# Patient Record
Sex: FEMALE | Race: WHITE | NOT HISPANIC OR LATINO | URBAN - METROPOLITAN AREA
[De-identification: names, ages, dates, MRNs, and addresses within clinical notes are randomized per-mention and may not be internally consistent; named-entity substitution may affect disease eponyms.]

---

## 2019-05-07 PROBLEM — Z00.00 ENCOUNTER FOR PREVENTIVE HEALTH EXAMINATION: Status: ACTIVE | Noted: 2019-05-07

## 2019-05-13 ENCOUNTER — OUTPATIENT (OUTPATIENT)
Dept: OUTPATIENT SERVICES | Facility: HOSPITAL | Age: 59
LOS: 1 days | End: 2019-05-13
Payer: COMMERCIAL

## 2019-05-13 ENCOUNTER — APPOINTMENT (OUTPATIENT)
Dept: MAMMOGRAPHY | Facility: CLINIC | Age: 59
End: 2019-05-13
Payer: COMMERCIAL

## 2019-05-13 DIAGNOSIS — Z12.39 ENCOUNTER FOR OTHER SCREENING FOR MALIGNANT NEOPLASM OF BREAST: ICD-10-CM

## 2019-05-13 PROCEDURE — 77063 BREAST TOMOSYNTHESIS BI: CPT | Mod: 26

## 2019-05-13 PROCEDURE — 77067 SCR MAMMO BI INCL CAD: CPT

## 2019-05-13 PROCEDURE — 77067 SCR MAMMO BI INCL CAD: CPT | Mod: 26

## 2019-05-13 PROCEDURE — 77063 BREAST TOMOSYNTHESIS BI: CPT

## 2020-10-19 ENCOUNTER — APPOINTMENT (OUTPATIENT)
Dept: RHEUMATOLOGY | Facility: CLINIC | Age: 60
End: 2020-10-19
Payer: COMMERCIAL

## 2020-10-19 VITALS
BODY MASS INDEX: 29.03 KG/M2 | TEMPERATURE: 99.8 F | SYSTOLIC BLOOD PRESSURE: 149 MMHG | WEIGHT: 185 LBS | HEART RATE: 102 BPM | OXYGEN SATURATION: 97 % | DIASTOLIC BLOOD PRESSURE: 90 MMHG | HEIGHT: 67 IN

## 2020-10-19 DIAGNOSIS — Z78.9 OTHER SPECIFIED HEALTH STATUS: ICD-10-CM

## 2020-10-19 DIAGNOSIS — Z72.0 TOBACCO USE: ICD-10-CM

## 2020-10-19 PROCEDURE — 99072 ADDL SUPL MATRL&STAF TM PHE: CPT

## 2020-10-19 PROCEDURE — 99205 OFFICE O/P NEW HI 60 MIN: CPT | Mod: 25

## 2020-10-19 PROCEDURE — 36415 COLL VENOUS BLD VENIPUNCTURE: CPT

## 2020-10-30 LAB — ERYTHROCYTE [SEDIMENTATION RATE] IN BLOOD BY WESTERGREN METHOD: 22 MM/HR

## 2020-11-16 PROBLEM — Z72.0 OCCASIONAL CIGARETTE SMOKER: Status: ACTIVE | Noted: 2020-11-16

## 2020-11-16 PROBLEM — Z78.9 SOCIAL ALCOHOL USE: Status: ACTIVE | Noted: 2020-11-16

## 2020-11-16 RX ORDER — VITAMIN K2 90 MCG
125 MCG CAPSULE ORAL
Qty: 30 | Refills: 11 | Status: ACTIVE | COMMUNITY
Start: 2020-11-16

## 2020-11-16 RX ORDER — IBUPROFEN 200 MG
600 CAPSULE ORAL
Qty: 60 | Refills: 11 | Status: ACTIVE | COMMUNITY
Start: 2020-11-16

## 2020-11-16 NOTE — CONSULT LETTER
[Dear  ___] : Dear  [unfilled], [Consult Letter:] : I had the pleasure of evaluating your patient, [unfilled]. [Please see my note below.] : Please see my note below. [Consult Closing:] : Thank you very much for allowing me to participate in the care of this patient.  If you have any questions, please do not hesitate to contact me. [Sincerely,] : Sincerely, [DrUday  ___] : Dr. TRUJILLO

## 2020-11-16 NOTE — HISTORY OF PRESENT ILLNESS
[FreeTextEntry1] : 60y F with MS with + Sjögren's abs\par \par - history of MS- not on treatments. Taking vitamin and supplements.\par - found to have +SSA because of LLE pain/stiffness swelling in L knee, L ankle swelling. Had MRI w/ no changes in spine/brain from previous 5 years ago.  Labs done. Stiffness in LLE.  Recent weight gain during quarantine- no weight loss. Stiffness x 5 min max usually, happens after prolonged sitting. No report of inflammatory arthritis\par - +Degenerative joint changes\par - denies major sicca symptoms, minimal. Denies pain. Denies any other systemic symptoms\par - ROS: denies constitutional sxs of fever/chills, weight loss, alopecia, oral/nasal ulcers, CP/SOB, hematuria/frothy urine, myalgias, arthralgias/arthritis, Raynaud's, rash, nodules, or photosensitivity.  No history of miscarriages or thromboses (DVT, PE, CVA, MI, etc).\par \par \par BMD\par - DEXA recently w/ osteoporosis. \par Previous treatments:  X\par Prior fractures: N\par Parental hip fractures: N\par Smoking history: Previous social\par Alcohol use:N\par Inflammatory arthritis/RA history: N\par Supplement use: calcium/Mg/Zn 500mg, D3 5000 IU daily, Zn, turmeric, vitamin C\par Hormone use: None. Normal age of menarche/menopause.\par  \par \par IImaging reviewed:\par MRI: degenerative disc disease lumbar spine [de-identified] : \par \par All other ROS negative except as mentioned in HPI.

## 2020-11-16 NOTE — ASSESSMENT
[FreeTextEntry1] : 60y F with history of MS w/ minimal to no sicca symptoms and SSA+, likely compatible with mild Sjögren's syndrome (SSA+, mild sicca w/o other organ system or immunologic/hematologic involvements). Notably, she has no other hematologic, immunologic, cardiopulmonary, neurologic, etc involvements.\par She has a hx of post-menopausal osteoporosis w/ last DEXA w/ dx of OP. She is currently on appropriate supplements and has no other significant FRAX criteria for increased fx risk.\par She has degenerative osteoarthritis and chronic LBP from spondylosis and DDD in the lumbar spine.\par \par Review of labs- normal early Sjögren's panel, normal inflammatory markers. AVISE panel negative w/ +JON strong positive and negative HEp-2 stain/pattern. She has a negative RF which is indicates usually a better prognosis for those w/ Sjögren's and reduced risk for extraglandular involvement or lymphoma risk.  She has +SSA (Ro) 52 and 60 kD IgG seen in Sjögren's and an equivocal RNA rush III likely not of any clinical significant, as she has no symptoms of systemic sclerosis and stable BP.\par She also has elevated ACL IgG of a significant titer and a low B2GP12 IgG. I do not recommend anticoagulation. I would recommend ASA as per cardiac risk.  Complements normal other than a slight anti-C1q IgG noted, which is common in Sjögren's syndrome and SLE. She has no history of thromboses or miscarriages and does not fulfill [place] criteria for APS. She had a normal RENAN/IPEP.\par \par Her metabolic bone labs are normal with good 25-OH D3, normal PTH, and normal bone ALP levels.\par \par - start alendronate 70mg/wk. R/B/A reviewed including ONJ - pt to call if any jaw surgery, implant, bone grafts, etc. Pt will not begin this treatment if any planned procedures and understands the risks. She understands the risks of esophagitis and will take w a full glass of water and remain upright for 30-40 minutes. If she experiences GI intolerance we can try Reclast IV or Prolia SQ as alternative treatment regimens, both of which, are fully acceptable.\par - c/w calcium 600mg BID w/ meals and D3 supplementation\par - c/w exercise, stretching, and joint supplements as needed\par \par RTO 6 months.

## 2020-11-20 ENCOUNTER — APPOINTMENT (OUTPATIENT)
Dept: UROGYNECOLOGY | Facility: CLINIC | Age: 60
End: 2020-11-20
Payer: COMMERCIAL

## 2020-11-20 VITALS
HEIGHT: 67 IN | SYSTOLIC BLOOD PRESSURE: 106 MMHG | BODY MASS INDEX: 28.41 KG/M2 | WEIGHT: 181 LBS | DIASTOLIC BLOOD PRESSURE: 70 MMHG

## 2020-11-20 DIAGNOSIS — K59.00 CONSTIPATION, UNSPECIFIED: ICD-10-CM

## 2020-11-20 DIAGNOSIS — G35 MULTIPLE SCLEROSIS: ICD-10-CM

## 2020-11-20 DIAGNOSIS — M35.00 SICCA SYNDROME, UNSPECIFIED: ICD-10-CM

## 2020-11-20 DIAGNOSIS — Z87.39 PERSONAL HISTORY OF OTHER DISEASES OF THE MUSCULOSKELETAL SYSTEM AND CONNECTIVE TISSUE: ICD-10-CM

## 2020-11-20 DIAGNOSIS — Z86.39 PERSONAL HISTORY OF OTHER ENDOCRINE, NUTRITIONAL AND METABOLIC DISEASE: ICD-10-CM

## 2020-11-20 LAB
BILIRUB UR QL STRIP: NORMAL
CLARITY UR: CLEAR
COLLECTION METHOD: NORMAL
GLUCOSE UR-MCNC: NORMAL
HCG UR QL: 0.2 EU/DL
HGB UR QL STRIP.AUTO: NORMAL
KETONES UR-MCNC: ABNORMAL
LEUKOCYTE ESTERASE UR QL STRIP: NORMAL
NITRITE UR QL STRIP: NORMAL
PH UR STRIP: 6.5
PROT UR STRIP-MCNC: NORMAL
SP GR UR STRIP: 1.02

## 2020-11-20 PROCEDURE — 99205 OFFICE O/P NEW HI 60 MIN: CPT

## 2020-11-20 RX ORDER — MAGNESIUM OXIDE/MAG AA CHELATE 300 MG
CAPSULE ORAL
Refills: 0 | Status: ACTIVE | COMMUNITY

## 2020-11-20 RX ORDER — ASCORBIC ACID 125 MG
TABLET,CHEWABLE ORAL
Refills: 0 | Status: ACTIVE | COMMUNITY

## 2020-11-20 RX ORDER — COLD-HOT PACK
EACH MISCELLANEOUS
Refills: 0 | Status: ACTIVE | COMMUNITY

## 2020-11-20 RX ORDER — MULTIVIT-MIN/IRON/FOLIC ACID/K 18-600-40
CAPSULE ORAL
Refills: 0 | Status: ACTIVE | COMMUNITY

## 2020-11-20 NOTE — DISCUSSION/SUMMARY
[FreeTextEntry1] : \par Tanisha presents with leakage of urine during orgasm, she does not have penetrative intercourse for the past many years, she reports she orgasms from masturbation. She otherwise does not  have incontinence or frequency. She does have a history of MS. On exam normal PVR, no prolapse. DIscussed OAB vs JAMILAH and unclear what type of incontinence this is. Discussed UDS given MS and to evaluate incontinence. We did review treatment options for both OAB and JAMILAH and will further address after UDS. All questions were answered. \par \par []  UDS

## 2020-11-20 NOTE — LETTER BODY
as tolerated [Dear  ___] : Dear  [unfilled], [Attached please find my note.] : Attached please find my note. [Thank you very much for allowing me to participate in the care of this patient. If you have any questions, please do not hesitate to contact me] : Thank you very much for allowing me to participate in the care of this patient. If you have any questions, please do not hesitate to contact me.

## 2020-11-20 NOTE — PROCEDURE
[FreeTextEntry1] : sterile straight catheterization performed to assess post void residual due to leakage of urine

## 2020-11-20 NOTE — REASON FOR VISIT
[Initial Visit ___] : an initial visit for [unfilled] [Questionnaire Received] : Patient questionnaire received [Intake Form Reviewed] : Patient intake form with past medical history, surgical history, family history and social history reviewed today [Urinary Incontinence] : urinary incontinence [Pelvic Strengthening] : pelvic strengthening

## 2020-11-20 NOTE — HISTORY OF PRESENT ILLNESS
[Cystocele (Obstetric)] : no [Vaginal Wall Prolapse] : no [Rectal Prolapse] : no [Stress Incontinence] : no [Urge Incontinence Of Urine] : no [Unable To Restrain Bowel Movement] : no [Urinary Frequency More Than Twice At Night (Nocturia)] : no nocturia [Urinary Stream Starts And Stops] : no [Urinary Frequency] : no [Urinary Tract Infection] : no [Hematuria] : no [Constipation Obstructed Defecation] : mild [Incomplete Emptying Of Stool] : no [Pelvic Pain] : no [Vaginal Pain] : no [Vulvar Pain] : no [] : no [FreeTextEntry1] : \par Buster presents with leakage with orgasm, she reports that this has been happening for 6-8 months with masturbation, denies leakage with laughing/coughing/sneezing, she reports sometimes she has urgency, but she has no experience urgency incontinence, reports during the day she does not have frequency., nocturia only if she drinks at night, no hematuria, denies incomplete emptying, denies recurrent UTI, denies intermittent stream, denies bulge, denies vaginal bleeding,  reports constipation, she has not tried anything for this besides PFE has been doing for 4 months without a difference \par \par she has a history of MS and diagnosed with spinal tap due to foot symptoms, then followed by MRI, diagnosed 13 years ago, does not take any medication, lately had some issues

## 2020-11-20 NOTE — PHYSICAL EXAM
[Chaperone Present] : A chaperone was present in the examining room during all aspects of the physical examination [No Acute Distress] : in no acute distress [Well developed] : well developed [Well Nourished] : ~L well nourished [Oriented x3] : oriented to person, place, and time [Normal Memory] : ~T memory was ~L unimpaired [Normal Mood/Affect] : mood and affect are normal [Cough] : no cough [No Edema] : ~T edema was not present [Tenderness] : ~T no ~M abdominal tenderness observed [Distended] : not distended [Inguinal LAD] : no adenopathy was noted in the inguinal lymph nodes [Warm and Dry] : was warm and dry to touch [Vulvar Atrophy] : vulvar atrophy [Labia Majora] : were normal [Labia Minora] : were normal [Normal Appearance] : general appearance was normal [No Bleeding] : there was no active vaginal bleeding [3] : 3 [Normal] : no abnormalities [Post Void Residual ____ml] : post void residual was [unfilled] ml [FreeTextEntry3] : neg CST [de-identified] : no prolapse  [de-identified] : pt reports she did not completely empty prior to void

## 2020-11-30 ENCOUNTER — NON-APPOINTMENT (OUTPATIENT)
Age: 60
End: 2020-11-30

## 2020-12-15 ENCOUNTER — APPOINTMENT (OUTPATIENT)
Dept: RHEUMATOLOGY | Facility: CLINIC | Age: 60
End: 2020-12-15
Payer: COMMERCIAL

## 2020-12-15 VITALS
BODY MASS INDEX: 27.47 KG/M2 | TEMPERATURE: 99.6 F | HEART RATE: 110 BPM | HEIGHT: 67 IN | DIASTOLIC BLOOD PRESSURE: 79 MMHG | SYSTOLIC BLOOD PRESSURE: 128 MMHG | WEIGHT: 175 LBS | OXYGEN SATURATION: 96 %

## 2020-12-15 DIAGNOSIS — M25.572 PAIN IN RIGHT ANKLE AND JOINTS OF RIGHT FOOT: ICD-10-CM

## 2020-12-15 DIAGNOSIS — G89.29 PAIN IN RIGHT ANKLE AND JOINTS OF RIGHT FOOT: ICD-10-CM

## 2020-12-15 DIAGNOSIS — M25.571 PAIN IN RIGHT ANKLE AND JOINTS OF RIGHT FOOT: ICD-10-CM

## 2020-12-15 DIAGNOSIS — M35.00 SICCA SYNDROME, UNSPECIFIED: ICD-10-CM

## 2020-12-15 DIAGNOSIS — Z91.89 OTHER SPECIFIED PERSONAL RISK FACTORS, NOT ELSEWHERE CLASSIFIED: ICD-10-CM

## 2020-12-15 PROCEDURE — 99072 ADDL SUPL MATRL&STAF TM PHE: CPT

## 2020-12-15 PROCEDURE — 99215 OFFICE O/P EST HI 40 MIN: CPT | Mod: 25

## 2020-12-15 RX ORDER — ALENDRONATE SODIUM 70 MG/1
70 TABLET ORAL
Qty: 1 | Refills: 0 | Status: DISCONTINUED | COMMUNITY
Start: 2020-10-19 | End: 2020-12-15

## 2020-12-16 ENCOUNTER — TRANSCRIPTION ENCOUNTER (OUTPATIENT)
Age: 60
End: 2020-12-16

## 2020-12-21 ENCOUNTER — APPOINTMENT (OUTPATIENT)
Dept: UROGYNECOLOGY | Facility: CLINIC | Age: 60
End: 2020-12-21

## 2021-01-12 ENCOUNTER — NON-APPOINTMENT (OUTPATIENT)
Age: 61
End: 2021-01-12

## 2021-01-26 PROBLEM — M35.00 SJOGREN'S SYNDROME WITHOUT EXTRAGLANDULAR INVOLVEMENT: Status: ACTIVE | Noted: 2020-11-16

## 2021-01-26 PROBLEM — Z91.89 AT HIGH RISK FOR FRACTURE: Status: ACTIVE | Noted: 2020-11-16

## 2021-05-18 ENCOUNTER — NON-APPOINTMENT (OUTPATIENT)
Age: 61
End: 2021-05-18

## 2021-06-11 ENCOUNTER — APPOINTMENT (OUTPATIENT)
Dept: NEPHROLOGY | Facility: CLINIC | Age: 61
End: 2021-06-11

## 2021-09-03 ENCOUNTER — APPOINTMENT (OUTPATIENT)
Age: 61
End: 2021-09-03
Payer: COMMERCIAL

## 2021-09-03 DIAGNOSIS — N39.41 URGE INCONTINENCE: ICD-10-CM

## 2021-09-03 DIAGNOSIS — N39.491 COITAL INCONTINENCE: ICD-10-CM

## 2021-09-03 PROCEDURE — 52000 CYSTOURETHROSCOPY: CPT

## 2021-09-03 RX ORDER — CLOBETASOL PROPIONATE 0.5 MG/G
0.05 OINTMENT TOPICAL DAILY
Qty: 1 | Refills: 0 | Status: ACTIVE | COMMUNITY
Start: 2021-09-03 | End: 1900-01-01

## 2021-09-08 ENCOUNTER — NON-APPOINTMENT (OUTPATIENT)
Age: 61
End: 2021-09-08

## 2021-11-12 ENCOUNTER — APPOINTMENT (OUTPATIENT)
Dept: NEUROSURGERY | Facility: CLINIC | Age: 61
End: 2021-11-12
Payer: COMMERCIAL

## 2021-11-12 VITALS
HEART RATE: 104 BPM | BODY MASS INDEX: 29.03 KG/M2 | TEMPERATURE: 98.2 F | WEIGHT: 185 LBS | DIASTOLIC BLOOD PRESSURE: 80 MMHG | HEIGHT: 67 IN | SYSTOLIC BLOOD PRESSURE: 120 MMHG

## 2021-11-12 PROCEDURE — 99204 OFFICE O/P NEW MOD 45 MIN: CPT

## 2021-11-12 NOTE — HISTORY OF PRESENT ILLNESS
[de-identified] : \monica Garay is a 61-year-old female with a history of multiple sclerosis whose here for my evaluation of her lumbar spine primarily.  She has difficulty walking and what appears to be some primary hip issues with the left side.  She was told by an orthopedic surgeon that she may need a hip replacement.  Over the course of the last year or so she has had some worsening pain in the leg and was in the emergency room in July 2020 for rule out DVT among other things.  At this point she has an MRI of her cervical spine which is about a-year-old an MRI of the lumbar which is more recent.  She is undergone some treatment to the left hip which she will continue to do.  Her multiple sclerosis by her account has been stable since its diagnosis and she is not on any medications for that preferentially.\par \par July 2020  ER visit LEFT leg  issues \par ruled out DVT etc\par \par PCP  Dr. Palma Knight \par Vascular surgeon  Ganga Prieto \par Neuro [facility]   EMG NCV   normal per patient \par Ortho Dr. REDDY Robb

## 2021-11-12 NOTE — PHYSICAL EXAM
[FreeTextEntry6] : Motor groups tested are mostly intact in terms of gross motor function however her proprioception her balance since her really bad.  She cannot balance on one leg more than the other.  And difficult to say whether it because the left leg gives out where she is not sensing herself in space.  Proximal and distal motor function however appears to be preserved.  She denies any numbness or tingling or other issues. [FreeTextEntry8] : Her gait is antalgic she favors the left leg and walks with a cane. [Antalgic] : antalgic [Able to toe walk] : the patient was not able to toe walk [Able to heel walk] : the patient was not able to heel walk

## 2021-11-12 NOTE — PLAN
[FreeTextEntry1] : \par DIAGNOSIS:    LUMBAR SPONDYLOSIS/STENOSIS     DISK DEGENERATION\par TREATMENT PLAN:  NON-SURGICAL\par \par This is a patient with degenerated lumbar disks with associated stenosis and spondylosis.  I have recommended nonsurgical management at this time.  This consists of physical therapy and/or manual medicine in conjunction to medical therapy and other conservative methods.  These include the consideration of trigger point injections and or the utilization of modalities such as TENS where applicable.  The next tier would be the referral to a pain management specialist (anesthesia or physiatry) for the consideration of spinal injections.  This includes the options of epidural steroids, facet injections as well as other novel techniques that may provide pain relief.  Although there is indeed evidence of disk degeneration on imaging, discectomy or spinal fusion for the sole purposes of treating back/leg pain in the absence of a compressive lesion or neurological issue is associated with poor outcomes.   \par \par I have reviewed the images in detail with the patient today in my office and have answered all questions regarding this condition to the best of my ability to the patient’s satisfaction.\par \par

## 2021-11-12 NOTE — REASON FOR VISIT
[New Patient Visit] : a new patient visit [Referred By: _________] : Patient was referred by RONNIE [FreeTextEntry1] : [facility]

## 2021-11-12 NOTE — RESULTS/DATA
[FreeTextEntry1] : [facility] imaging of the cervical and lumbar spine were reviewed cervical spine shows multilevel spondylotic disease with a disc osteophyte at C5-6 and area of what is referred to his multiple sclerosis plaque behind it is also a couple of other areas that are likely associated with her multiple sclerosis diagnosis.  Lumbar spine shows multilevel degenerative changes with some mild to moderate stenosis at L4-5 with a lateral disc bulge at this level.

## 2022-03-23 LAB
A-TUMOR NECROSIS FACT SERPL-MCNC: 3.2 PG/ML
A-TUMOR NECROSIS FACT SERPL-MCNC: <1.7 PG/ML
ALBUMIN MFR SERPL ELPH: 57.7 %
ALBUMIN SERPL ELPH-MCNC: 4.6 G/DL
ALBUMIN SERPL-MCNC: 4.3 G/DL
ALBUMIN/GLOB SERPL: 1.4 RATIO
ALP BLD-CCNC: 96 U/L
ALP BONE SERPL-MCNC: 18 MCG/L
ALPHA1 GLOB MFR SERPL ELPH: 3.5 %
ALPHA1 GLOB SERPL ELPH-MCNC: 0.3 G/DL
ALPHA2 GLOB MFR SERPL ELPH: 10.3 %
ALPHA2 GLOB SERPL ELPH-MCNC: 0.8 G/DL
ALT SERPL-CCNC: 46 U/L
ANION GAP SERPL CALC-SCNC: 12 MMOL/L
AST SERPL-CCNC: 30 U/L
B-GLOBULIN MFR SERPL ELPH: 11 %
B-GLOBULIN SERPL ELPH-MCNC: 0.8 G/DL
BASOPHILS # BLD AUTO: 0.04 K/UL
BASOPHILS NFR BLD AUTO: 0.8 %
BILIRUB SERPL-MCNC: 0.4 MG/DL
BUN SERPL-MCNC: 16 MG/DL
C3D SERPL-MCNC: <12
CA VI IGA AB: 4.1 EU/ML
CA VI IGG AB: 5.9 EU/ML
CA VI IGM AB: 4.8 EU/ML
CALCIUM SERPL-MCNC: 9.2 MG/DL
CALCIUM SERPL-MCNC: 9.6 MG/DL
CARDIOLIPIN AB SER IA-ACNC: NEGATIVE
CHLORIDE SERPL-SCNC: 104 MMOL/L
CO2 SERPL-SCNC: 22 MMOL/L
CONFIRM: 28 SEC
CREAT SERPL-MCNC: 0.84 MG/DL
CRP SERPL-MCNC: 0.22 MG/DL
CRP SERPL-MCNC: 0.32 MG/DL
DEPRECATED KAPPA LC FREE/LAMBDA SER: 1.42 RATIO
DRVVT IMM 1:2 NP PPP: NORMAL
DRVVT SCREEN TO CONFIRM RATIO: 0.9 RATIO
EOSINOPHIL # BLD AUTO: 0.07 K/UL
EOSINOPHIL NFR BLD AUTO: 1.3 %
ERYTHROCYTE [SEDIMENTATION RATE] IN BLOOD BY WESTERGREN METHOD: 21 MM/HR
GAMMA GLOB FLD ELPH-MCNC: 1.3 G/DL
GAMMA GLOB MFR SERPL ELPH: 17.5 %
GLUCOSE SERPL-MCNC: 94 MG/DL
HCT VFR BLD CALC: 44.4 %
HGB BLD-MCNC: 14 G/DL
HLA-B27 RELATED AG QL: NEGATIVE
IGA SER QL IEP: 198 MG/DL
IGG SER QL IEP: 1331 MG/DL
IGM SER QL IEP: 91 MG/DL
IGNF SERPL-MCNC: <4.2 PG/ML
IGNF SERPL-MCNC: <4.2 PG/ML
IL10 SERPL-MCNC: <2.8 PG/ML
IL10 SERPL-MCNC: <2.8 PG/ML
IL12 SERPL-MCNC: <1.9 PG/ML
IL12 SERPL-MCNC: <1.9 PG/ML
IL13 SERPL-MCNC: 2.1 PG/ML
IL13 SERPL-MCNC: <1.7 PG/ML
IL17A SERPL-MCNC: <1.4 PG/ML
IL17A SERPL-MCNC: <1.4 PG/ML
IL2 SERPL-MCNC: 223.9 PG/ML
IL2 SERPL-MCNC: 310.2 PG/ML
IL2 SERPL-MCNC: <2.1 PG/ML
IL2 SERPL-MCNC: <2.1 PG/ML
IL4 SERPL-MCNC: <2.2 PG/ML
IL4 SERPL-MCNC: <2.2 PG/ML
IL6 SERPL-MCNC: <2 PG/ML
IL6 SERPL-MCNC: <2 PG/ML
IL8 SERPL-MCNC: <3 PG/ML
IL8 SERPL-MCNC: <3 PG/ML
IMM GRANULOCYTES NFR BLD AUTO: 0.6 %
INTERLEUKIN 1 BETA: <6.5 PG/ML
INTERLEUKIN 1 BETA: <6.5 PG/ML
INTERLEUKIN 5: <2.1 PG/ML
INTERLEUKIN 5: <2.1 PG/ML
INTERPRETATION SERPL IEP-IMP: NORMAL
KAPPA LC CSF-MCNC: 0.86 MG/DL
KAPPA LC SERPL-MCNC: 1.22 MG/DL
LDH SERPL-CCNC: 152 U/L
LYMPHOCYTES # BLD AUTO: 1.48 K/UL
LYMPHOCYTES NFR BLD AUTO: 28.5 %
M PROTEIN SPEC IFE-MCNC: NORMAL
MAN DIFF?: NORMAL
MCHC RBC-ENTMCNC: 29.3 PG
MCHC RBC-ENTMCNC: 31.5 GM/DL
MCV RBC AUTO: 92.9 FL
MISCELLANEOUS TEST: NORMAL
MONOCYTES # BLD AUTO: 0.42 K/UL
MONOCYTES NFR BLD AUTO: 8.1 %
NEUTROPHILS # BLD AUTO: 3.15 K/UL
NEUTROPHILS NFR BLD AUTO: 60.7 %
PARATHYROID HORMONE INTACT: 38 PG/ML
PLATELET # BLD AUTO: 265 K/UL
POTASSIUM SERPL-SCNC: 4.3 MMOL/L
PROC NAME: NORMAL
PROT SERPL-MCNC: 7.2 G/DL
PROT SERPL-MCNC: 7.4 G/DL
PROT SERPL-MCNC: 7.4 G/DL
PSP IGA AB: 3.1 EU/ML
PSP IGG AB: 4 EU/ML
PSP IGM AB: 8.6 EU/ML
RBC # BLD: 4.78 M/UL
RBC # FLD: 13.2 %
RHEUMATOID FACT SER QL: 12 IU/ML
SCREEN DRVVT: 28 SEC
SEROLOGY COMMENTS: NORMAL
SILICA CLOTTING TIME INTERPRETATION: NORMAL
SILICA CLOTTING TIME S/C: 0.96 RATIO
SODIUM SERPL-SCNC: 139 MMOL/L
SP-1 IGA AB: NORMAL
SP-1 IGG AB: 3.4 EU/ML
SP-1 IGM AB: 4.2 EU/ML
WBC # FLD AUTO: 5.19 K/UL

## 2023-04-04 ENCOUNTER — NON-APPOINTMENT (OUTPATIENT)
Age: 63
End: 2023-04-04

## 2023-04-25 ENCOUNTER — APPOINTMENT (OUTPATIENT)
Dept: RHEUMATOLOGY | Facility: CLINIC | Age: 63
End: 2023-04-25
Payer: COMMERCIAL

## 2023-04-25 VITALS
RESPIRATION RATE: 18 BRPM | OXYGEN SATURATION: 99 % | HEART RATE: 103 BPM | DIASTOLIC BLOOD PRESSURE: 79 MMHG | WEIGHT: 158 LBS | SYSTOLIC BLOOD PRESSURE: 113 MMHG | BODY MASS INDEX: 24.8 KG/M2 | HEIGHT: 67 IN

## 2023-04-25 DIAGNOSIS — R76.8 OTHER SPECIFIED ABNORMAL IMMUNOLOGICAL FINDINGS IN SERUM: ICD-10-CM

## 2023-04-25 DIAGNOSIS — M47.816 SPONDYLOSIS W/OUT MYELOPATHY OR RADICULOPATHY, LUMBAR REGION: ICD-10-CM

## 2023-04-25 DIAGNOSIS — Z71.89 OTHER SPECIFIED COUNSELING: ICD-10-CM

## 2023-04-25 DIAGNOSIS — E27.8 OTHER SPECIFIED DISORDERS OF ADRENAL GLAND: ICD-10-CM

## 2023-04-25 DIAGNOSIS — R76.0 RAISED ANTIBODY TITER: ICD-10-CM

## 2023-04-25 DIAGNOSIS — R21 RASH AND OTHER NONSPECIFIC SKIN ERUPTION: ICD-10-CM

## 2023-04-25 DIAGNOSIS — S73.191A OTHER SPRAIN OF RIGHT HIP, INITIAL ENCOUNTER: ICD-10-CM

## 2023-04-25 DIAGNOSIS — M16.11 UNILATERAL PRIMARY OSTEOARTHRITIS, RIGHT HIP: ICD-10-CM

## 2023-04-25 DIAGNOSIS — M47.812 SPONDYLOSIS W/OUT MYELOPATHY OR RADICULOPATHY, CERVICAL REGION: ICD-10-CM

## 2023-04-25 DIAGNOSIS — M81.0 AGE-RELATED OSTEOPOROSIS W/OUT CURRENT PATHOLOGICAL FRACTURE: ICD-10-CM

## 2023-04-25 DIAGNOSIS — Z76.89 PERSONS ENCOUNTERING HEALTH SERVICES IN OTHER SPECIFIED CIRCUMSTANCES: ICD-10-CM

## 2023-04-25 PROCEDURE — 99214 OFFICE O/P EST MOD 30 MIN: CPT

## 2023-07-10 PROBLEM — M81.0 POST-MENOPAUSAL OSTEOPOROSIS: Status: ACTIVE | Noted: 2020-10-19

## 2023-07-10 PROBLEM — R76.0 ANTICARDIOLIPIN ANTIBODY POSITIVE: Status: ACTIVE | Noted: 2020-12-15

## 2023-07-10 PROBLEM — Z71.89 ENCOUNTER FOR HERB AND VITAMIN SUPPLEMENT MANAGEMENT: Status: ACTIVE | Noted: 2020-11-16

## 2023-07-10 PROBLEM — M47.816 LUMBAR SPONDYLOSIS: Status: ACTIVE | Noted: 2021-11-12

## 2023-07-10 PROBLEM — Z76.89 ENCOUNTER FOR NEW MEDICATION PRESCRIPTION: Status: ACTIVE | Noted: 2020-11-16

## 2023-07-10 PROBLEM — M16.11 PRIMARY OSTEOARTHRITIS OF RIGHT HIP: Status: ACTIVE | Noted: 2020-12-15

## 2023-07-10 PROBLEM — M47.812 CERVICAL SPONDYLOSIS: Status: ACTIVE | Noted: 2021-11-12

## 2023-07-11 ENCOUNTER — TRANSCRIPTION ENCOUNTER (OUTPATIENT)
Age: 63
End: 2023-07-11

## 2023-08-17 ENCOUNTER — APPOINTMENT (OUTPATIENT)
Dept: CARDIOLOGY | Facility: CLINIC | Age: 63
End: 2023-08-17
Payer: COMMERCIAL

## 2023-08-17 VITALS
BODY MASS INDEX: 26.37 KG/M2 | OXYGEN SATURATION: 96 % | SYSTOLIC BLOOD PRESSURE: 120 MMHG | DIASTOLIC BLOOD PRESSURE: 81 MMHG | WEIGHT: 168 LBS | HEART RATE: 113 BPM | HEIGHT: 67 IN

## 2023-08-17 DIAGNOSIS — R94.31 ABNORMAL ELECTROCARDIOGRAM [ECG] [EKG]: ICD-10-CM

## 2023-08-17 PROCEDURE — 93000 ELECTROCARDIOGRAM COMPLETE: CPT

## 2023-08-17 PROCEDURE — 99204 OFFICE O/P NEW MOD 45 MIN: CPT | Mod: 25

## 2023-08-17 RX ORDER — IBANDRONATE SODIUM 150 MG/1
150 TABLET ORAL
Qty: 1 | Refills: 3 | Status: DISCONTINUED | COMMUNITY
Start: 2020-12-15 | End: 2023-08-17

## 2023-08-17 NOTE — HISTORY OF PRESENT ILLNESS
[FreeTextEntry1] : Pt is a 62 y/o F who is referred here today by their PCP for evaluation.  She has PMH multiple sclerosis, Sjogrens, DVT LLE 10/2021 on Eliquis x5 weeks.  She c/o L ankle stiffness x3 yrs and color changes from knee down when she stands for a period of time since L hip surgery 08/2022.  She denies CP, SOB, diaphoresis, palpitations, dizziness, syncope, LE edema, PND, orthopnea.  She has been following with cardiologist, Dr Jenkins.    PCP Dr Haro vascular Drs: Dr Mireles, Dr Prieto, Dr Hancock Family hx: no cardiac disease Smoking status: never social ETOH no drug use Current exercise: 6x/week weight training and cardio Daily water intake: 8 cups  Daily caffeine intake: not consistent OTC medications: none  Previous cardiac testing: nuc stress test 08/2022 "normal" Previous hospitalizations: L hip replacement 08/2020 - no problems with anesthesia

## 2023-08-21 ENCOUNTER — TRANSCRIPTION ENCOUNTER (OUTPATIENT)
Age: 63
End: 2023-08-21

## 2023-09-13 ENCOUNTER — APPOINTMENT (OUTPATIENT)
Dept: CARDIOLOGY | Facility: CLINIC | Age: 63
End: 2023-09-13
Payer: COMMERCIAL

## 2023-09-13 PROCEDURE — 93306 TTE W/DOPPLER COMPLETE: CPT

## 2023-09-14 PROBLEM — R94.31 ABNORMAL EKG: Status: ACTIVE | Noted: 2023-09-13

## 2023-09-19 ENCOUNTER — TRANSCRIPTION ENCOUNTER (OUTPATIENT)
Age: 63
End: 2023-09-19

## 2023-10-31 ENCOUNTER — APPOINTMENT (OUTPATIENT)
Dept: UROGYNECOLOGY | Facility: CLINIC | Age: 63
End: 2023-10-31
Payer: COMMERCIAL

## 2023-10-31 VITALS
BODY MASS INDEX: 26.37 KG/M2 | SYSTOLIC BLOOD PRESSURE: 120 MMHG | HEIGHT: 67 IN | WEIGHT: 168 LBS | DIASTOLIC BLOOD PRESSURE: 88 MMHG

## 2023-10-31 PROCEDURE — 99214 OFFICE O/P EST MOD 30 MIN: CPT

## 2023-10-31 PROCEDURE — 51798 US URINE CAPACITY MEASURE: CPT

## 2023-11-13 RX ORDER — OXYBUTYNIN CHLORIDE 5 MG/1
5 TABLET, EXTENDED RELEASE ORAL
Qty: 30 | Refills: 0 | Status: ACTIVE | COMMUNITY
Start: 2023-11-13 | End: 1900-01-01

## 2024-01-17 RX ORDER — NITROFURANTOIN (MONOHYDRATE/MACROCRYSTALS) 25; 75 MG/1; MG/1
100 CAPSULE ORAL
Qty: 10 | Refills: 0 | Status: ACTIVE | COMMUNITY
Start: 2024-01-17 | End: 1900-01-01

## 2024-01-23 ENCOUNTER — APPOINTMENT (OUTPATIENT)
Dept: UROGYNECOLOGY | Facility: CLINIC | Age: 64
End: 2024-01-23
Payer: MEDICARE

## 2024-01-23 LAB
BILIRUB UR QL STRIP: NORMAL
COLLECTION METHOD: NORMAL
GLUCOSE UR-MCNC: NORMAL
HCG UR QL: 0.2 EU/DL
HGB UR QL STRIP.AUTO: NEGATIVE
KETONES UR-MCNC: 160
LEUKOCYTE ESTERASE UR QL STRIP: NEGATIVE
NITRITE UR QL STRIP: NEGATIVE
PH UR STRIP: 5.5
PROT UR STRIP-MCNC: NEGATIVE
SP GR UR STRIP: 1.03

## 2024-01-23 PROCEDURE — 81003 URINALYSIS AUTO W/O SCOPE: CPT | Mod: QW

## 2024-01-23 PROCEDURE — 52287 CYSTOSCOPY CHEMODENERVATION: CPT

## 2024-01-29 ENCOUNTER — TRANSCRIPTION ENCOUNTER (OUTPATIENT)
Age: 64
End: 2024-01-29

## 2024-02-12 ENCOUNTER — NON-APPOINTMENT (OUTPATIENT)
Age: 64
End: 2024-02-12

## 2024-03-07 ENCOUNTER — APPOINTMENT (OUTPATIENT)
Dept: ORTHOPEDIC SURGERY | Facility: CLINIC | Age: 64
End: 2024-03-07
Payer: MEDICARE

## 2024-03-07 ENCOUNTER — NON-APPOINTMENT (OUTPATIENT)
Age: 64
End: 2024-03-07

## 2024-03-07 VITALS — BODY MASS INDEX: 29.03 KG/M2 | HEIGHT: 67 IN | WEIGHT: 185 LBS

## 2024-03-07 PROCEDURE — 73502 X-RAY EXAM HIP UNI 2-3 VIEWS: CPT | Mod: LT

## 2024-03-07 PROCEDURE — 99204 OFFICE O/P NEW MOD 45 MIN: CPT | Mod: 25

## 2024-03-07 NOTE — DISCUSSION/SUMMARY
[de-identified] : Left lower extremity weakness, history of MS, status post left total hip arthroplasty  Extensive discussion of the natural history of this issue was had with the patient.  I discussed that it seems her total hip is functioning well.  Discussed this weakness sensation to be coming from multiple sources and may have a neurologic cause.  Her hip would not explain the ankle stiffness and heaviness and weakness.  Discussed she may have some gluteal tendinitis.  I recommend she follow-up with PM&R for closer evaluation of this weakness and possible neurologic testing.  Discussed we could consider an MRI of the hip although I feel this is not the most likely cause at this point.  Patient will follow-up as needed.

## 2024-03-07 NOTE — PHYSICAL EXAM
[de-identified] : Left hip Tender palpation over greater troch No pain with deep flexion internal rotation, normal range of motion Mild pain with resisted abduction, minimal discomfort with resisted hip flexion, no pain with resisted hip adduction [de-identified] : 3/7/24: AP pelvis and lateral left hip-hardware in good alignment Prior MRIs of spine, preop MRIs of hip, MRIs of knees reviewed-minor findings none of which fully explain the current symptoms.

## 2024-03-07 NOTE — HISTORY OF PRESENT ILLNESS
[de-identified] : 3/7/24: Patient presents with left lower extremity weakness.  States she is her left ankle feels stiff and heavy.  Eventually she underwent a left total hip arthroplasty by Dr. FREDERICK 1 and half years ago.  Feels this did not help her limp and she did not improve in the surgery.  Is complaining of weakness in the hip as well as the ankle.  Soreness on the lateral aspect of her hip.  Denies groin pain.  Does have some baseline numbness in her bilateral feet from her MS.  Ambulating with a cane.  Has done physical therapy which postop, denies tobacco use.  PMH-MS which is stable, MRIs unchanged for 5 years reportedly, Sjogren's syndrome

## 2024-03-15 ENCOUNTER — APPOINTMENT (OUTPATIENT)
Dept: PHYSICAL MEDICINE AND REHAB | Facility: CLINIC | Age: 64
End: 2024-03-15
Payer: MEDICARE

## 2024-03-15 VITALS
DIASTOLIC BLOOD PRESSURE: 86 MMHG | BODY MASS INDEX: 29.03 KG/M2 | OXYGEN SATURATION: 98 % | HEART RATE: 97 BPM | RESPIRATION RATE: 14 BRPM | HEIGHT: 67 IN | WEIGHT: 185 LBS | SYSTOLIC BLOOD PRESSURE: 123 MMHG

## 2024-03-15 PROCEDURE — 99204 OFFICE O/P NEW MOD 45 MIN: CPT

## 2024-03-15 NOTE — ASSESSMENT
[FreeTextEntry1] : 64-year-old female with history of obesity, multiple sclerosis (diagnosed age 47), Sjogren's disease, osteoporosis, anticardiolipin antibody positive status, DVT left leg (2021) and left total hip arthroplasty (Aug 2022) referred to office for complaints of stiffness and heaviness in left ankle and leg beginning in 2020.  I spent most of today's office visit (45 minutes) reviewing the patient's relevant imaging studies, discussing etiology, pathogenesis, further diagnostic workup and nonoperative management.  MRI lumbar spine, left knee and left ankle are largely non diagnostic.  I explained to the patient that her feeling of left ankle and calf stiffness and heaviness is not related to an underlying orthopedic condition.  I also believe that the weakness in her left hip girdle status post posterolateral total hip arthroplasty in 2022 is not likely related to a common peripheral nerve complication from the surgery (ie, sciatic mononeuropathy) as the weakness in her left leg is largely similar to her right leg.  It is my opinion that the patient's pre- and postoperative left lower extremity weakness is likely from her history of multiple sclerosis even with stable brain MRIs.  I have asked the patient to obtain her most recent electrodiagnostic study for my review.  We discussed the utility of repeating her EDX study to ensure that we are not missing any LMN dysfunction.  The patient is in agreement with the plan.  All questions have been answered.  RTC for EMG left leg.

## 2024-03-15 NOTE — DATA REVIEWED
[MRI] : MRI [FreeTextEntry1] : MRI L-spine (Oct 2021): Vertebral body heights: Maintained.  Alignment: Lower lumbar dextroconvex scoliosis  Marrow signal: No acute fracture or marrow replacing lesion is seen  Spinal canal: The conus terminates at L1 and is unremarkable  Paravertebral soft tissues: Unremarkable.  Discs: There is degenerative disc disease with decreased T2 disc signal and disc space are at from L2-3 through L4-5  L1-2: There is diffuse disc bulging without central canal stenosis  L2-3: There is diffuse disc bulging without central canal stenosis. Is mild right foraminal narrowing  L3-4: There is diffuse disc bulging without central canal stenosis. Is mild left foraminal narrowing  L4-5: There is mild diffuse disc bulging and facet arthropathy resulting in moderate left foraminal narrowing  L5-S1: There is small central disc herniation and facet arthropathy without central canal stenosis  MRI Left Knee (Sept. 2021): Mild tricompartmental degenerative changes. No evidence of meniscal or ligament tears. M17.12  Mild distal quadriceps tendinosis, without tears. M76.892  X-rays Left Hip (July 2021): mild left hip OA  MRI Left Ankle (July 2021): normal

## 2024-03-15 NOTE — HISTORY OF PRESENT ILLNESS
[FreeTextEntry1] : 64-year-old female with history of obesity, multiple sclerosis (diagnosed age 47), Sjogren's disease, osteoporosis, anticardiolipin antibody positive status, DVT left leg (2021) and left total hip arthroplasty (Aug 2022) referred to office for complaints of stiffness and heaviness in left ankle and leg beginning in 2020.  Pt. reported that she would feel that she was dragging her left leg.  Pt. first consulted outside ortho hip MD who opined that she did not need DANYA at the time.  Then saw Ortho Spine MD who obtained MRI L-spine which was age appropriate.  Pt. then consulted Foot/Ankle surgeon who performed POCUS evaluation left knee who found Baker's cyst.  Then saw Vascular MD who checked for DVT which was negative initially but then positive in late 2021 for 2 DVTs left leg and pt. was started on Eliquis and then stabilized and resolved but sxs remained.  Saw chiro and P.T. which was helpful.  Saw her MS neurologist who performed EMG which was reportedly normal.  Pt. saw Dr. Eugene who opined that DANYA would resolve her limp but not pain.  Pt. states that after DANYA surgery she was unable to left leg 2' weakness but not pain.  Still had feeling of heaviness and stiffness in left ankle.  Of note, pt. had posterior lateral approach not anterior.  Still uses cane now for protected ambulation.  Endorses intermittent LBP and endorses N/T in feet and right palm from her history MS.

## 2024-03-15 NOTE — PHYSICAL EXAM
[FreeTextEntry1] : NAD A&Ox3 Mild obesity ROM L-spine: 3/4 full forward flexion w/o pain; 10-15' extension w/o pain ROM Hips: smooth IR/ER w/o pain Pelvic tilt: neg Seated slump test: neg left SLR: neg left DTR's: 2+ knees; absent ankles MMT: 4-/5 b/l HFs/HABD; 5-/5 B/L KE; 4/5 B/L TA; 5/5 B/L PF Sensation: SILT.  No decrement to PP L3-S1 dermatomes Toe & Heel Walk: Able to toe walk w/ assistance; slight difficulty heel walking b/l Palpation: L GT femur NTTP; L GMed tendon NTTP

## 2024-04-26 ENCOUNTER — APPOINTMENT (OUTPATIENT)
Dept: PHYSICAL MEDICINE AND REHAB | Facility: CLINIC | Age: 64
End: 2024-04-26
Payer: MEDICARE

## 2024-04-26 VITALS
DIASTOLIC BLOOD PRESSURE: 78 MMHG | HEART RATE: 92 BPM | HEIGHT: 67 IN | BODY MASS INDEX: 29.03 KG/M2 | RESPIRATION RATE: 15 BRPM | SYSTOLIC BLOOD PRESSURE: 113 MMHG | WEIGHT: 185 LBS

## 2024-04-26 DIAGNOSIS — Z96.642 PRESENCE OF LEFT ARTIFICIAL HIP JOINT: ICD-10-CM

## 2024-04-26 DIAGNOSIS — R29.898 OTHER SYMPTOMS AND SIGNS INVOLVING THE MUSCULOSKELETAL SYSTEM: ICD-10-CM

## 2024-04-26 PROCEDURE — 95909 NRV CNDJ TST 5-6 STUDIES: CPT

## 2024-04-26 PROCEDURE — 95886 MUSC TEST DONE W/N TEST COMP: CPT | Mod: LT

## 2024-04-26 NOTE — PROCEDURE
[de-identified] : PRE-PROCEDURE  * Was H&P completed and in chart at time of procedure ?  YES * Were the indications for the procedure appropriate ?  YES * Were the practitioner's entries in the patient's medical record appropriate ?  YES  PROCEDURE * Did the practitioner maintain proper sterile technique ?  YES * If bleeding was encountered, did the practitioner manage it appropriately?  YES * Were complications, if any, recognized and managed appropriately?  YES * Was the practitioner's use of diagnostic services (e.g., lab, x-ray, MRI, CT) appropriate?  YES  POST-PROCEDURE * Did the pre-procedure diagnosis coincide with the findings of the procedure?  YES * Was the procedure report complete, accurate and timely?  YES

## 2024-04-26 NOTE — ASSESSMENT
[FreeTextEntry1] : EMG/NCS LEFT LE performed at today's office visit.  EDX findings significant for Chronic, sensory, peripheral polyneuropathy affecting the patient's legs.  Electrodiagnostic findings are suspicious for chronic left L5/S1 radiculopathy but cannot definitively rule out sciatic mononeuropathy.  Full report to follow.

## 2024-05-07 ENCOUNTER — NON-APPOINTMENT (OUTPATIENT)
Age: 64
End: 2024-05-07

## 2024-05-08 ENCOUNTER — TRANSCRIPTION ENCOUNTER (OUTPATIENT)
Age: 64
End: 2024-05-08

## 2024-05-10 ENCOUNTER — APPOINTMENT (OUTPATIENT)
Dept: PHYSICAL MEDICINE AND REHAB | Facility: CLINIC | Age: 64
End: 2024-05-10

## 2024-05-20 ENCOUNTER — TRANSCRIPTION ENCOUNTER (OUTPATIENT)
Age: 64
End: 2024-05-20

## 2024-06-24 ENCOUNTER — TRANSCRIPTION ENCOUNTER (OUTPATIENT)
Age: 64
End: 2024-06-24

## 2024-07-12 ENCOUNTER — APPOINTMENT (OUTPATIENT)
Dept: PHYSICAL MEDICINE AND REHAB | Facility: CLINIC | Age: 64
End: 2024-07-12
Payer: MEDICARE

## 2024-07-12 VITALS
WEIGHT: 185 LBS | HEIGHT: 67 IN | DIASTOLIC BLOOD PRESSURE: 87 MMHG | RESPIRATION RATE: 15 BRPM | SYSTOLIC BLOOD PRESSURE: 116 MMHG | BODY MASS INDEX: 29.03 KG/M2 | OXYGEN SATURATION: 98 % | HEART RATE: 103 BPM

## 2024-07-12 DIAGNOSIS — M47.816 SPONDYLOSIS W/OUT MYELOPATHY OR RADICULOPATHY, LUMBAR REGION: ICD-10-CM

## 2024-07-12 DIAGNOSIS — R29.898 OTHER SYMPTOMS AND SIGNS INVOLVING THE MUSCULOSKELETAL SYSTEM: ICD-10-CM

## 2024-07-12 DIAGNOSIS — Z96.642 PRESENCE OF LEFT ARTIFICIAL HIP JOINT: ICD-10-CM

## 2024-07-12 PROCEDURE — 99214 OFFICE O/P EST MOD 30 MIN: CPT

## 2025-02-21 ENCOUNTER — TRANSCRIPTION ENCOUNTER (OUTPATIENT)
Age: 65
End: 2025-02-21

## 2025-02-24 ENCOUNTER — TRANSCRIPTION ENCOUNTER (OUTPATIENT)
Age: 65
End: 2025-02-24

## 2025-02-25 ENCOUNTER — TRANSCRIPTION ENCOUNTER (OUTPATIENT)
Age: 65
End: 2025-02-25

## 2025-02-25 DIAGNOSIS — D17.71 BENIGN LIPOMATOUS NEOPLASM OF KIDNEY: ICD-10-CM

## 2025-03-04 ENCOUNTER — APPOINTMENT (OUTPATIENT)
Dept: CARDIOLOGY | Facility: CLINIC | Age: 65
End: 2025-03-04
Payer: MEDICARE

## 2025-03-04 ENCOUNTER — NON-APPOINTMENT (OUTPATIENT)
Age: 65
End: 2025-03-04

## 2025-03-04 VITALS
HEART RATE: 93 BPM | DIASTOLIC BLOOD PRESSURE: 76 MMHG | OXYGEN SATURATION: 98 % | WEIGHT: 170 LBS | BODY MASS INDEX: 26.68 KG/M2 | SYSTOLIC BLOOD PRESSURE: 128 MMHG | HEIGHT: 67 IN

## 2025-03-04 DIAGNOSIS — R94.31 ABNORMAL ELECTROCARDIOGRAM [ECG] [EKG]: ICD-10-CM

## 2025-03-04 DIAGNOSIS — I26.99 OTHER PULMONARY EMBOLISM W/OUT ACUTE COR PULMONALE: ICD-10-CM

## 2025-03-04 PROCEDURE — 99214 OFFICE O/P EST MOD 30 MIN: CPT | Mod: 25

## 2025-03-04 PROCEDURE — 93000 ELECTROCARDIOGRAM COMPLETE: CPT

## 2025-03-04 RX ORDER — RIVAROXABAN 20 MG/1
20 TABLET, FILM COATED ORAL
Refills: 0 | Status: ACTIVE | COMMUNITY
Start: 2025-03-04

## 2025-03-28 ENCOUNTER — TRANSCRIPTION ENCOUNTER (OUTPATIENT)
Age: 65
End: 2025-03-28

## 2025-04-15 ENCOUNTER — APPOINTMENT (OUTPATIENT)
Dept: CARDIOLOGY | Facility: CLINIC | Age: 65
End: 2025-04-15

## 2025-07-10 ENCOUNTER — APPOINTMENT (OUTPATIENT)
Dept: CARDIOLOGY | Facility: CLINIC | Age: 65
End: 2025-07-10
Payer: MEDICARE

## 2025-07-10 VITALS
SYSTOLIC BLOOD PRESSURE: 126 MMHG | OXYGEN SATURATION: 98 % | HEIGHT: 67 IN | WEIGHT: 170 LBS | DIASTOLIC BLOOD PRESSURE: 76 MMHG | HEART RATE: 98 BPM | BODY MASS INDEX: 26.68 KG/M2

## 2025-07-10 PROBLEM — R00.2 PALPITATIONS: Status: ACTIVE | Noted: 2025-07-10

## 2025-07-10 PROCEDURE — 99214 OFFICE O/P EST MOD 30 MIN: CPT | Mod: 25

## 2025-07-10 PROCEDURE — 93000 ELECTROCARDIOGRAM COMPLETE: CPT | Mod: 59

## 2025-07-10 PROCEDURE — 93242 EXT ECG>48HR<7D RECORDING: CPT

## 2025-07-14 ENCOUNTER — TRANSCRIPTION ENCOUNTER (OUTPATIENT)
Age: 65
End: 2025-07-14

## 2025-07-28 PROCEDURE — 93244 EXT ECG>48HR<7D REV&INTERPJ: CPT

## 2025-07-30 ENCOUNTER — OUTPATIENT (OUTPATIENT)
Dept: OUTPATIENT SERVICES | Facility: HOSPITAL | Age: 65
LOS: 1 days | End: 2025-07-30
Payer: COMMERCIAL

## 2025-07-30 ENCOUNTER — APPOINTMENT (OUTPATIENT)
Dept: CT IMAGING | Facility: CLINIC | Age: 65
End: 2025-07-30
Payer: MEDICARE

## 2025-07-30 DIAGNOSIS — Z00.8 ENCOUNTER FOR OTHER GENERAL EXAMINATION: ICD-10-CM

## 2025-07-30 DIAGNOSIS — R00.2 PALPITATIONS: ICD-10-CM

## 2025-07-30 PROCEDURE — 75574 CT ANGIO HRT W/3D IMAGE: CPT | Mod: 26

## 2025-07-30 PROCEDURE — 75574 CT ANGIO HRT W/3D IMAGE: CPT
